# Patient Record
Sex: FEMALE | Race: WHITE | Employment: OTHER | URBAN - METROPOLITAN AREA
[De-identification: names, ages, dates, MRNs, and addresses within clinical notes are randomized per-mention and may not be internally consistent; named-entity substitution may affect disease eponyms.]

---

## 2017-12-14 ENCOUNTER — HOSPITAL ENCOUNTER (OUTPATIENT)
Dept: CT IMAGING | Age: 52
Discharge: HOME | End: 2017-12-14
Payer: COMMERCIAL

## 2017-12-14 DIAGNOSIS — R91.8 PULMONARY NODULES: ICD-10-CM

## 2017-12-14 PROCEDURE — 71250 CT THORAX DX C-: CPT

## 2019-04-29 ENCOUNTER — OFFICE VISIT (OUTPATIENT)
Dept: FAMILY MEDICINE CLINIC | Age: 54
End: 2019-04-29

## 2019-04-29 VITALS
BODY MASS INDEX: 37.38 KG/M2 | TEMPERATURE: 98.6 F | OXYGEN SATURATION: 99 % | RESPIRATION RATE: 16 BRPM | WEIGHT: 198 LBS | HEART RATE: 92 BPM | HEIGHT: 61 IN | DIASTOLIC BLOOD PRESSURE: 79 MMHG | SYSTOLIC BLOOD PRESSURE: 129 MMHG

## 2019-04-29 DIAGNOSIS — L02.214 ABSCESS OF GROIN, RIGHT: Primary | ICD-10-CM

## 2019-04-29 DIAGNOSIS — L03.314 CELLULITIS OF GROIN, RIGHT: ICD-10-CM

## 2019-04-29 PROCEDURE — 10060 I&D ABSCESS SIMPLE/SINGLE: CPT | Performed by: NURSE PRACTITIONER

## 2019-04-29 PROCEDURE — 99202 OFFICE O/P NEW SF 15 MIN: CPT | Performed by: NURSE PRACTITIONER

## 2019-04-29 RX ORDER — LEVOTHYROXINE SODIUM 0.03 MG/1
25 TABLET ORAL
COMMUNITY

## 2019-04-29 RX ORDER — DOXYCYCLINE HYCLATE 100 MG/1
100 CAPSULE ORAL 2 TIMES DAILY
Qty: 20 CAPSULE | Refills: 0 | Status: SHIPPED | OUTPATIENT
Start: 2019-04-29 | End: 2019-05-09

## 2019-04-29 ASSESSMENT — ENCOUNTER SYMPTOMS
SHORTNESS OF BREATH: 0
CHEST TIGHTNESS: 0
WHEEZING: 0
COUGH: 0
RESPIRATORY NEGATIVE: 1

## 2019-04-29 NOTE — PROGRESS NOTES
[address] [facility]   [address] [facility]  [address] [facility] [address]  Dept: [phone]  Dept Fax: [phone]     Earlene Daugherty is a 48 y.o. female who presents to the urgent care today for her medicalconditions/complaints as noted below. Earlene Daugherty is c/o of Rash (fever, skin lesion x three days  )    HPI:      Rash   This is a new problem. Episode onset: 3 days ago. The problem has been gradually worsening since onset. Location: right thigh/groin. The rash is characterized by redness, pain and swelling. She was exposed to nothing. Associated symptoms include a fever (low grade). Pertinent negatives include no cough, fatigue or shortness of breath. Treatments tried: neosporin, tylenol/motrin. The treatment provided no relief. Past Medical History:   Diagnosis Date    Allergic rhinitis     Anxiety     Asthma     Cervical dysplasia 1999    Depression     Diarrhea     Epigastric pain     Fissure, anal     GERD (gastroesophageal reflux disease)     Headache(784.0)     Hematemesis     Hypertension     Hypothyroid     Irritable bowel syndrome     Obstructive sleep apnea on CPAP     Osteoarthritis     Rectal bleed     from anal fissures      Current Outpatient Medications   Medication Sig Dispense Refill    doxycycline hyclate (VIBRAMYCIN) 100 MG capsule Take 1 capsule by mouth 2 times daily for 10 days 20 capsule 0    linaclotide (LINZESS) 145 MCG capsule Take 1 capsule by mouth every morning (before breakfast) 90 capsule 0    levothyroxine (SYNTHROID) 25 MCG tablet Take 25 mcg by mouth daily       pantoprazole (PROTONIX) 20 MG tablet Take 20 mg by mouth 2 times daily       lisinopril-hydrochlorothiazide (PRINZIDE;ZESTORETIC) 20-12.5 MG per tablet Take 1 tablet by mouth daily.  citalopram (CELEXA) 40 MG tablet Take 40 mg by mouth daily.  fluticasone (FLOVENT HFA) 220 MCG/ACT inhaler Inhale 1 puff into the lungs 2 times daily.       levothyroxine (SYNTHROID) 25 MCG tablet Take 25 mcg by mouth       No current facility-administered medications for this visit. Allergies   Allergen Reactions    Other      Mayonnaise, Can eat cooked eggs, H/O reaction to vaccination as baby    Sulfa Antibiotics Hives     Reviewed PMH, SH, and FH with the patient and updated. Subjective:      Review of Systems   Constitutional: Positive for chills, diaphoresis and fever (low grade). Negative for fatigue. Respiratory: Negative. Negative for cough, chest tightness, shortness of breath and wheezing. Cardiovascular: Negative. Negative for chest pain. Skin: Positive for rash (right groin). All other systems reviewed and are negative. Objective:      Physical Exam   Constitutional: She appears well-developed. No distress. HENT:   Head: Normocephalic and atraumatic. Cardiovascular: Normal rate, regular rhythm and normal heart sounds. No murmur heard. Pulmonary/Chest: Effort normal and breath sounds normal. No respiratory distress. She has no wheezes. Neurological: She is alert. Skin: Skin is warm and dry. Rash noted. Rash is pustular (noted to the right groin/upper thigh). She is not diaphoretic. There is erythema (surrounding the right groin abscess and extending across the upper thigh). Nursing note and vitals reviewed. /79 (Site: Left Upper Arm, Position: Sitting, Cuff Size: Large Adult)   Pulse 92   Temp 98.6 °F (37 °C) (Oral)   Resp 16   Ht 5' 1\" (1.549 m)   Wt 198 lb (89.8 kg)   SpO2 99%   BMI 37.41 kg/m²     Assessment:       Diagnosis Orders   1. Abscess of groin, right  07615 - HI DRAIN SKIN ABSCESS SIMPLE   2. Cellulitis of groin, right  doxycycline hyclate (VIBRAMYCIN) 100 MG capsule     Plan:      Anesthesia with 1% Lidocaine with Epinephrine. Right groin cleansed with betadine and 1 cm incision made using No. 10 blade scapel. Approximately 10 ml of purulent drainage expressed. Patient tolerated well. Estimated blood loss: < 10 ml. Incision dressed. Wound care instructions provided. Patient instructed to complete antibiotic prescription fully. Warm compresses TID for 15 minutes at a time. Dressing changes as needed. Tylenol/Motrin as needed for the discomfort/fever. Patient agreeable to treatment plan. Educational materials provided on AVS.  Follow up if symptoms do not improve/worsen. Orders Placed This Encounter   Medications    doxycycline hyclate (VIBRAMYCIN) 100 MG capsule     Sig: Take 1 capsule by mouth 2 times daily for 10 days     Dispense:  20 capsule     Refill:  0        Patient given educational materials - see patient instructions. Discussed use, benefit, and side effects of prescribed medications. All patientquestions answered. Pt voiced understanding.     Electronically signed by JORGE Redding CNP on 4/29/2019at 7:37 PM

## 2019-04-29 NOTE — PATIENT INSTRUCTIONS
scrapes, or other injuries to your skin. Cellulitis most often occurs where there is a break in the skin. · If you get a scrape, cut, mild burn, or bite, wash the wound with clean water as soon as you can to help avoid infection. Don't use hydrogen peroxide or alcohol, which can slow healing. · If you have swelling in your legs (edema), support stockings and good skin care may help prevent leg sores and cellulitis. · Take care of your feet, especially if you have diabetes or other conditions that increase the risk of infection. Wear shoes and socks. Do not go barefoot. If you have athlete's foot or other skin problems on your feet, talk to your doctor about how to treat them. When should you call for help? Call your doctor now or seek immediate medical care if:    · You have signs that your infection is getting worse, such as:  ? Increased pain, swelling, warmth, or redness. ? Red streaks leading from the area. ? Pus draining from the area. ? A fever.     · You get a rash.    Watch closely for changes in your health, and be sure to contact your doctor if:    · You do not get better as expected. Where can you learn more? Go to https://[employer].Liquipel. org and sign in to your [employer] account. Enter G686 in the [employer] box to learn more about \"Cellulitis: Care Instructions. \"     If you do not have an account, please click on the \"Sign Up Now\" link. Current as of: April 17, 2018  Content Version: 11.9  © 8676-9976 [employer]. Care instructions adapted under license by [facility] ([facility]). If you have questions about a medical condition or this instruction, always ask your healthcare professional. [place] [address] any warranty or liability for your use of this information. Patient Education        Skin Abscess: Care Instructions  Your Care Instructions    A skin abscess is a bacterial infection that forms a pocket of pus.  A boil is a kind of skin streaks leading from the infected skin. ? Pus draining from the wound. ? A fever.    Watch closely for changes in your health, and be sure to contact your doctor if:    · You do not get better as expected. Where can you learn more? Go to https://chpepiceweb.Fyreball. org and sign in to your [employer] account. Enter Y368 in the [employer] box to learn more about \"Skin Abscess: Care Instructions. \"     If you do not have an account, please click on the \"Sign Up Now\" link. Current as of: April 17, 2018  Content Version: 11.9  © 4055-7984 [employer]. Care instructions adapted under license by [facility] ([facility]). If you have questions about a medical condition or this instruction, always ask your healthcare professional. [address] any warranty or liability for your use of this information.